# Patient Record
Sex: FEMALE | Race: WHITE | NOT HISPANIC OR LATINO
[De-identification: names, ages, dates, MRNs, and addresses within clinical notes are randomized per-mention and may not be internally consistent; named-entity substitution may affect disease eponyms.]

---

## 2023-08-13 ENCOUNTER — NON-APPOINTMENT (OUTPATIENT)
Age: 23
End: 2023-08-13

## 2023-08-13 DIAGNOSIS — Z86.79 PERSONAL HISTORY OF OTHER DISEASES OF THE CIRCULATORY SYSTEM: ICD-10-CM

## 2023-08-13 DIAGNOSIS — Z87.42 PERSONAL HISTORY OF OTHER DISEASES OF THE FEMALE GENITAL TRACT: ICD-10-CM

## 2023-08-13 DIAGNOSIS — R76.8 OTHER SPECIFIED ABNORMAL IMMUNOLOGICAL FINDINGS IN SERUM: ICD-10-CM

## 2023-08-13 DIAGNOSIS — R23.1 PALLOR: ICD-10-CM

## 2023-08-13 PROBLEM — Z00.00 ENCOUNTER FOR PREVENTIVE HEALTH EXAMINATION: Status: ACTIVE | Noted: 2023-08-13

## 2023-08-14 ENCOUNTER — APPOINTMENT (OUTPATIENT)
Dept: CARDIOLOGY | Facility: CLINIC | Age: 23
End: 2023-08-14
Payer: MEDICAID

## 2023-08-14 ENCOUNTER — NON-APPOINTMENT (OUTPATIENT)
Age: 23
End: 2023-08-14

## 2023-08-14 VITALS
HEART RATE: 66 BPM | OXYGEN SATURATION: 98 % | WEIGHT: 113 LBS | DIASTOLIC BLOOD PRESSURE: 62 MMHG | SYSTOLIC BLOOD PRESSURE: 92 MMHG

## 2023-08-14 VITALS — HEART RATE: 75 BPM | SYSTOLIC BLOOD PRESSURE: 92 MMHG | DIASTOLIC BLOOD PRESSURE: 66 MMHG

## 2023-08-14 VITALS — SYSTOLIC BLOOD PRESSURE: 102 MMHG | DIASTOLIC BLOOD PRESSURE: 68 MMHG | HEART RATE: 47 BPM

## 2023-08-14 DIAGNOSIS — Z83.3 FAMILY HISTORY OF DIABETES MELLITUS: ICD-10-CM

## 2023-08-14 DIAGNOSIS — Z87.898 PERSONAL HISTORY OF OTHER SPECIFIED CONDITIONS: ICD-10-CM

## 2023-08-14 PROBLEM — Z86.79 HISTORY OF RAYNAUD'S SYNDROME: Status: RESOLVED | Noted: 2023-08-14 | Resolved: 2023-08-14

## 2023-08-14 PROBLEM — Z87.42 HISTORY OF OVARIAN CYST: Status: RESOLVED | Noted: 2023-08-14 | Resolved: 2023-08-14

## 2023-08-14 PROBLEM — R76.8 ANA POSITIVE: Status: RESOLVED | Noted: 2023-08-14 | Resolved: 2023-08-14

## 2023-08-14 PROBLEM — R23.1 LIVEDO RETICULARIS: Status: RESOLVED | Noted: 2023-08-14 | Resolved: 2023-08-14

## 2023-08-14 PROCEDURE — 93246 EXT ECG>7D<15D RECORDING: CPT

## 2023-08-14 PROCEDURE — 93000 ELECTROCARDIOGRAM COMPLETE: CPT

## 2023-08-14 PROCEDURE — 99205 OFFICE O/P NEW HI 60 MIN: CPT | Mod: 25

## 2023-08-14 RX ORDER — EPINEPHRINE 0.3 MG/.3ML
0.3 INJECTION INTRAMUSCULAR
Qty: 2 | Refills: 0 | Status: ACTIVE | COMMUNITY
Start: 2023-05-22

## 2023-08-14 RX ORDER — IBUPROFEN 600 MG/1
600 TABLET, FILM COATED ORAL
Qty: 30 | Refills: 0 | Status: ACTIVE | COMMUNITY
Start: 2023-06-27

## 2023-08-14 NOTE — HISTORY OF PRESENT ILLNESS
[FreeTextEntry1] : Ms. Contreras is being seen for syncope. 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  She has Raynaud's and a positive bhavna- sees Mary Sun, last seen in May. She has seen Zara Cline gyn for ruptured ovarian cysts. ( from symptoms). She was an endocrinologist last week to assess her  ovarian cysts, Dr Robert Villegas.  She was in [place] visiting. her boyfriend- and went to the er for syncope.  In June during the pain (of a ruptured cyst), it was hot, they were in the bathroom, she seemed sleepy to her boyfriend and had a syncopal episode.  She has had more episodes since between  10-20.  last night after change in position. She exercises and dances 2- 3 times a weeks.   Vitals are in the system ( but not showing in [facility] note) 102/68 supine  92/66 standing, became symptomatic, had a presyncopal episode.

## 2023-08-21 ENCOUNTER — NON-APPOINTMENT (OUTPATIENT)
Age: 23
End: 2023-08-21

## 2023-08-21 ENCOUNTER — APPOINTMENT (OUTPATIENT)
Dept: CARDIOLOGY | Facility: CLINIC | Age: 23
End: 2023-08-21
Payer: MEDICAID

## 2023-08-21 VITALS — DIASTOLIC BLOOD PRESSURE: 60 MMHG | SYSTOLIC BLOOD PRESSURE: 120 MMHG

## 2023-08-21 VITALS — HEART RATE: 69 BPM | SYSTOLIC BLOOD PRESSURE: 108 MMHG | DIASTOLIC BLOOD PRESSURE: 68 MMHG

## 2023-08-21 VITALS — DIASTOLIC BLOOD PRESSURE: 66 MMHG | SYSTOLIC BLOOD PRESSURE: 100 MMHG

## 2023-08-21 DIAGNOSIS — R55 SYNCOPE AND COLLAPSE: ICD-10-CM

## 2023-08-21 PROCEDURE — 93000 ELECTROCARDIOGRAM COMPLETE: CPT

## 2023-08-21 PROCEDURE — 99214 OFFICE O/P EST MOD 30 MIN: CPT | Mod: 25

## 2023-08-21 NOTE — REVIEW OF SYSTEMS
[Weight Gain (___ Lbs)] : no recent weight gain [Weight Loss (___ Lbs)] : no recent weight loss [SOB] : no shortness of breath [Dyspnea on exertion] : not dyspnea during exertion [Chest Discomfort] : no chest discomfort [Lower Ext Edema] : no extremity edema [Palpitations] : no palpitations [Syncope] : syncope [Joint Pain] : no joint pain [Myalgia] : no myalgia [Easy Bleeding] : no tendency for easy bleeding [Easy Bruising] : no tendency for easy bruising [Negative] : Respiratory

## 2023-08-21 NOTE — HISTORY OF PRESENT ILLNESS
[FreeTextEntry1] : Ms. Contreras is being seen for syncope. 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  She has Raynaud's and a positive bhavna- sees Mary Sun, last seen in May. She has seen Zara Cline gyn for ruptured ovarian cysts. ( from symptoms). She was at endocrinologist last week to assess her  ovarian cysts, Dr Robert Villegas.  She was in [place] visiting. her boyfriend- and went to the er for syncope.  In June during the pain (of a ruptured cyst), it was hot, they were in the bathroom, she seemed sleepy to her boyfriend and had a syncopal episode.

## 2023-08-21 NOTE — REASON FOR VISIT
[FreeTextEntry1] : Comes today for f/u with syncopal episodes Has had a few more episodes since visit last week with Dr Chang- Does have Zio in place One episode EMS called - told of an EKG "abnormality" Most episodes happen with change in position feels hot when occurs, all episodes witnessed - family does not leave her alone She is active, does not dance at level she used to but overall athletic Episodes just started few months ago with ovarian issues

## 2023-08-22 ENCOUNTER — RESULT REVIEW (OUTPATIENT)
Age: 23
End: 2023-08-22

## 2023-08-23 ENCOUNTER — APPOINTMENT (OUTPATIENT)
Dept: CARDIOLOGY | Facility: CLINIC | Age: 23
End: 2023-08-23

## 2023-08-28 ENCOUNTER — APPOINTMENT (OUTPATIENT)
Dept: CARDIOLOGY | Facility: CLINIC | Age: 23
End: 2023-08-28

## 2023-08-29 PROCEDURE — 93248 EXT ECG>7D<15D REV&INTERPJ: CPT

## 2023-10-05 ENCOUNTER — APPOINTMENT (OUTPATIENT)
Dept: NEUROLOGY | Facility: CLINIC | Age: 23
End: 2023-10-05

## 2023-11-08 ENCOUNTER — APPOINTMENT (OUTPATIENT)
Dept: CARDIOLOGY | Facility: CLINIC | Age: 23
End: 2023-11-08